# Patient Record
Sex: MALE | Race: WHITE | NOT HISPANIC OR LATINO | URBAN - NONMETROPOLITAN AREA
[De-identification: names, ages, dates, MRNs, and addresses within clinical notes are randomized per-mention and may not be internally consistent; named-entity substitution may affect disease eponyms.]

---

## 2018-02-26 ENCOUNTER — OFFICE VISIT (OUTPATIENT)
Dept: FAMILY MEDICINE | Facility: OTHER | Age: 28
End: 2018-02-26
Attending: FAMILY MEDICINE
Payer: COMMERCIAL

## 2018-02-26 VITALS — WEIGHT: 273.8 LBS | RESPIRATION RATE: 18 BRPM | TEMPERATURE: 98 F

## 2018-02-26 DIAGNOSIS — L25.3 CONTACT DERMATITIS DUE TO OTHER CHEMICAL PRODUCT, UNSPECIFIED CONTACT DERMATITIS TYPE: Primary | ICD-10-CM

## 2018-02-26 PROCEDURE — 99213 OFFICE O/P EST LOW 20 MIN: CPT | Performed by: FAMILY MEDICINE

## 2018-02-26 RX ORDER — PREDNISONE 10 MG/1
TABLET ORAL
Qty: 38 TABLET | Refills: 0 | Status: SHIPPED | OUTPATIENT
Start: 2018-02-26 | End: 2019-09-20

## 2018-02-26 ASSESSMENT — ENCOUNTER SYMPTOMS
FEVER: 0
CHILLS: 0
WHEEZING: 0
COUGH: 0

## 2018-02-26 ASSESSMENT — PAIN SCALES - GENERAL: PAINLEVEL: NO PAIN (0)

## 2018-02-26 NOTE — MR AVS SNAPSHOT
"              After Visit Summary   2018    Perfecto Zhang    MRN: [id number]           Patient Information     Date Of Birth          1990        Visit Information        Provider Department      2018 11:30 AM Burak Santana MD [facility]        Today's Diagnoses     Contact dermatitis due to other chemical product, unspecified contact dermatitis type    -  1       Follow-ups after your visit        Follow-up notes from your care team     Return if symptoms worsen or fail to improve.      Who to contact     If you have questions or need follow up information about today's clinic visit or your schedule please contact [facility] directly at [phone].  Normal or non-critical lab and imaging results will be communicated to you by Lootsiehart, letter or phone within 4 business days after the clinic has received the results. If you do not hear from us within 7 days, please contact the clinic through Lootsiehart or phone. If you have a critical or abnormal lab result, we will notify you by phone as soon as possible.  Submit refill requests through [email or web address] or call your pharmacy and they will forward the refill request to us. Please allow 3 business days for your refill to be completed.          Additional Information About Your Visit        MyChart Information     [email or web address] lets you send messages to your doctor, view your test results, renew your prescriptions, schedule appointments and more. To sign up, go to [email or web address] . Click on \"Log in\" on the left side of the screen, which will take you to the Welcome page. Then click on \"Sign up Now\" on the right side of the page.     You will be asked to enter the access code listed below, as well as some personal information. Please follow the directions to create your username and password.     Your access code is: [email or web address]  Expires: 2018 12:02 PM     Your access code will  in 90 days. If you need help " or a new code, please call your [place] clinic or [phone].        Care EveryWhere ID     This is your Care EveryWhere ID. This could be used by other organizations to access your [facility] [place] medical records  [id number]        Your Vitals Were     Temperature Respirations                98  F (36.7  C) (Temporal) 18           Blood Pressure from Last 3 Encounters:   06/08/16 138/68   01/21/16 148/86    Weight from Last 3 Encounters:   02/26/18 273 lb 12.8 oz (124.2 kg)   06/08/16 212 lb 9.6 oz (96.4 kg)   01/21/16 208 lb 3.2 oz (94.4 kg)              Today, you had the following     No orders found for display         Today's Medication Changes          These changes are accurate as of 2/26/18 12:02 PM.  If you have any questions, ask your nurse or doctor.               Start taking these medicines.        Dose/Directions    predniSONE 10 MG tablet   Commonly known as:  DELTASONE   Used for:  Contact dermatitis due to other chemical product, unspecified contact dermatitis type   Started by:  Burak Santana MD        Two tabs twice daily for four days One tab twice daily for four days One tab helms for four days One half tab daily for two days   Quantity:  38 tablet   Refills:  0            Where to get your medicines      These medications were sent to [facility] [address] [place], [place] - [address]  [address], [facility] [address]     Phone:  [phone]     predniSONE 10 MG tablet                Primary Care Provider Fax #    Physician No Ref-Primary [phone]       No address on file        Equal Access to Services     MICHAEL JONAS : Hadii bg macario Sotito, waaxda luqadaha, qaybta kaalmada christiano bobo. So [facility] [phone].    ATENCIÓN: Si habla español, tiene a brandon disposición servicios gratuitos de asistencia lingüística. Llame al [phone].    We comply with applicable federal civil rights laws and  [place] laws. We do not discriminate on the basis of race, color, national origin, age, disability, sex, sexual orientation, or gender identity.            Thank you!     Thank you for choosing [facility] AND [facility]  for your care. Our goal is always to provide you with excellent care. Hearing back from our patients is one way we can continue to improve our services. Please take a few minutes to complete the written survey that you may receive in the mail after your visit with us. Thank you!             Your Updated Medication List - Protect others around you: Learn how to safely use, store and throw away your medicines at www.disposemymeds.org.          This list is accurate as of 2/26/18 12:02 PM.  Always use your most recent med list.                   Brand Name Dispense Instructions for use Diagnosis    predniSONE 10 MG tablet    DELTASONE    38 tablet    Two tabs twice daily for four days One tab twice daily for four days One tab helms for four days One half tab daily for two days    Contact dermatitis due to other chemical product, unspecified contact dermatitis type

## 2018-02-26 NOTE — PROGRESS NOTES
SUBJECTIVE:   Perfecto Zhang is a 27 year old male who presents to clinic today for the following health issues:    HPI Comments: Rash for about 1 week.  Getting worse.  Comes in raised patches on both arms and face only.  Some of the lesions will weep clear fluid.  It is very itchy.  Exposed to lots of chemicals at work.  At this time he does not want to report it as work comp.  There is a rust preventor and a foamer which has come in contact with his face and arms.  No new foods.  Initially he thought it was poison ivy from wearing an old hunting shirt.  Calomine helps.  Started benadryl last night with some relief as well.        There are no active problems to display for this patient.    Past Surgical History:   Procedure Laterality Date     OTHER SURGICAL HISTORY      [id number],NO PAST SURGERIES     No current outpatient prescriptions on file.     Allergies   Allergen Reactions     Penicillins Unknown     Family allergy.       Review of Systems   Constitutional: Negative for chills and fever.   Respiratory: Negative for cough and wheezing.    Skin: Positive for rash.        OBJECTIVE:     Temp 98  F (36.7  C) (Temporal)  Resp 18  Wt 273 lb 12.8 oz (124.2 kg)  There is no height or weight on file to calculate BMI.  Physical Exam   Constitutional: He is oriented to person, place, and time. He appears well-developed and well-nourished. No distress.   Neurological: He is alert and oriented to person, place, and time.   Skin: He is not diaphoretic.   bilat forearms with red raised patches, generally round, rough on palpation.  Each side with about 20 or so.  Face has similar spots, into forehead, about 10 or so on face.       Diagnostic Test Results:  none     ASSESSMENT/PLAN:       (L25.3) Contact dermatitis due to other chemical product, unspecified contact dermatitis type  (primary encounter diagnosis)  Comment: new  Plan: predniSONE (DELTASONE) 10 MG tablet               Given this is distributed over on his  face and forearms only, it makes me think more of a contact dermatitis.  Can be allergic, or even chemical irritant from work.  Will do a prednisone taper.      Burak Santana MD  [facility] AND [facility]

## 2018-02-26 NOTE — NURSING NOTE
coming in with a rash on the face and arms, last Monday  Julieta Good LPN on 2/26/2018 at 11:38 AM

## 2018-03-01 ENCOUNTER — DOCUMENTATION ONLY (OUTPATIENT)
Dept: FAMILY MEDICINE | Facility: OTHER | Age: 28
End: 2018-03-01

## 2019-09-20 ENCOUNTER — OFFICE VISIT (OUTPATIENT)
Dept: FAMILY MEDICINE | Facility: OTHER | Age: 29
End: 2019-09-20
Attending: FAMILY MEDICINE
Payer: COMMERCIAL

## 2019-09-20 VITALS
BODY MASS INDEX: 34.59 KG/M2 | RESPIRATION RATE: 16 BRPM | OXYGEN SATURATION: 98 % | WEIGHT: 278.2 LBS | SYSTOLIC BLOOD PRESSURE: 139 MMHG | HEART RATE: 94 BPM | TEMPERATURE: 98.6 F | HEIGHT: 75 IN | DIASTOLIC BLOOD PRESSURE: 83 MMHG

## 2019-09-20 DIAGNOSIS — Z00.00 ROUTINE GENERAL MEDICAL EXAMINATION AT A HEALTH CARE FACILITY: Primary | ICD-10-CM

## 2019-09-20 DIAGNOSIS — E66.9 OBESITY (BMI 35.0-39.9 WITHOUT COMORBIDITY): ICD-10-CM

## 2019-09-20 LAB
CHOLEST SERPL-MCNC: 178 MG/DL
GLUCOSE SERPL-MCNC: 81 MG/DL (ref 70–105)
HDLC SERPL-MCNC: 41 MG/DL (ref 23–92)
LDLC SERPL CALC-MCNC: 110 MG/DL
NONHDLC SERPL-MCNC: 137 MG/DL
TRIGL SERPL-MCNC: 134 MG/DL

## 2019-09-20 PROCEDURE — 36415 COLL VENOUS BLD VENIPUNCTURE: CPT | Mod: ZL | Performed by: FAMILY MEDICINE

## 2019-09-20 PROCEDURE — 80061 LIPID PANEL: CPT | Mod: ZL | Performed by: FAMILY MEDICINE

## 2019-09-20 PROCEDURE — 90471 IMMUNIZATION ADMIN: CPT | Performed by: FAMILY MEDICINE

## 2019-09-20 PROCEDURE — 99395 PREV VISIT EST AGE 18-39: CPT | Mod: 25 | Performed by: FAMILY MEDICINE

## 2019-09-20 PROCEDURE — 82947 ASSAY GLUCOSE BLOOD QUANT: CPT | Mod: ZL | Performed by: FAMILY MEDICINE

## 2019-09-20 PROCEDURE — 90715 TDAP VACCINE 7 YRS/> IM: CPT | Performed by: FAMILY MEDICINE

## 2019-09-20 ASSESSMENT — PAIN SCALES - GENERAL: PAINLEVEL: NO PAIN (1)

## 2019-09-20 ASSESSMENT — ANXIETY QUESTIONNAIRES
1. FEELING NERVOUS, ANXIOUS, OR ON EDGE: NOT AT ALL
6. BECOMING EASILY ANNOYED OR IRRITABLE: NOT AT ALL
GAD7 TOTAL SCORE: 0
3. WORRYING TOO MUCH ABOUT DIFFERENT THINGS: NOT AT ALL
5. BEING SO RESTLESS THAT IT IS HARD TO SIT STILL: NOT AT ALL
IF YOU CHECKED OFF ANY PROBLEMS ON THIS QUESTIONNAIRE, HOW DIFFICULT HAVE THESE PROBLEMS MADE IT FOR YOU TO DO YOUR WORK, TAKE CARE OF THINGS AT HOME, OR GET ALONG WITH OTHER PEOPLE: NOT DIFFICULT AT ALL
7. FEELING AFRAID AS IF SOMETHING AWFUL MIGHT HAPPEN: NOT AT ALL
2. NOT BEING ABLE TO STOP OR CONTROL WORRYING: NOT AT ALL

## 2019-09-20 ASSESSMENT — PATIENT HEALTH QUESTIONNAIRE - PHQ9: 5. POOR APPETITE OR OVEREATING: NOT AT ALL

## 2019-09-20 ASSESSMENT — MIFFLIN-ST. JEOR: SCORE: 2314.6

## 2019-09-20 NOTE — LETTER
September 24, 2019      Perfecto Zhang  [address]  [facility] [address]        Dear Perfecto,     This is great.    Results for orders placed or performed in visit on 09/20/19   Glucose   Result Value Ref Range    Glucose 81 70 - 105 mg/dL   Lipid Profile   Result Value Ref Range    Cholesterol 178 <200 mg/dL    Triglycerides 134 <150 mg/dL    HDL Cholesterol 41 23 - 92 mg/dL    LDL Cholesterol Calculated 110 (H) <100 mg/dL    Non HDL Cholesterol 137 (H) <130 mg/dL           Sincerely,        Burak Santana MD

## 2019-09-20 NOTE — NURSING NOTE
"Coming in for a physical check up    Chief Complaint   Patient presents with     Physical     check up       Initial /83 (BP Location: Right arm, Patient Position: Sitting, Cuff Size: Adult Large)   Pulse 94   Temp 98.6  F (37  C) (Tympanic)   Resp 16   Ht 1.892 m (6' 2.5\")   Wt 126.2 kg (278 lb 3.2 oz)   SpO2 98%   BMI 35.24 kg/m   Estimated body mass index is 35.24 kg/m  as calculated from the following:    Height as of this encounter: 1.892 m (6' 2.5\").    Weight as of this encounter: 126.2 kg (278 lb 3.2 oz).  Medication Reconciliation: complete    Julieta Good LPN  "

## 2019-09-20 NOTE — PROGRESS NOTES
SUBJECTIVE:   CC: Perfecto Zhang is an 28 year old male who presents for preventive health visit.     Healthy Habits:    Do you get at least three servings of calcium containing foods daily (dairy, green leafy vegetables, etc.)? no    Amount of exercise or daily activities, outside of work: 1-3 day(s) per week    Problems taking medications regularly No    Medication side effects: No    Have you had an eye exam in the past two years? no    Do you see a dentist twice per year? yes    Do you have sleep apnea, excessive snoring or daytime drowsiness?no      Dad with MI in his 50's.  Brother with diabetes at age 35.  Wants to be tested.  Pt has no symptoms of either.    Today's PHQ-2 Score:   PHQ-2 ( 1999 [employer]) 9/20/2019 2/26/2018   Q1: Little interest or pleasure in doing things 0 0   Q2: Feeling down, depressed or hopeless 0 0   PHQ-2 Score 0 0       Abuse: Current or Past(Physical, Sexual or Emotional)- No  Do you feel safe in your environment? Yes    Social History     Tobacco Use     Smoking status: Never Smoker     Smokeless tobacco: Never Used   Substance Use Topics     Alcohol use: No     Alcohol/week: 0.0 oz     If you drink alcohol do you typically have >3 drinks per day or >7 drinks per week? No                      Last PSA: No results found for: PSA    Reviewed orders with patient. Reviewed health maintenance and updated orders accordingly - Yes  Lab work is in process  No current outpatient medications on file.     Allergies   Allergen Reactions     Penicillins Unknown     Family allergy.       Reviewed and updated as needed this visit by clinical staff  Tobacco  Allergies  Meds  Med Hx  Soc Hx        Reviewed and updated as needed this visit by Provider        History reviewed. No pertinent past medical history.   Past Surgical History:   Procedure Laterality Date     OTHER SURGICAL HISTORY      [id number],NO PAST SURGERIES       ROS:  CONSTITUTIONAL: NEGATIVE for fever, chills, change in  "weight  INTEGUMENTARY/SKIN: NEGATIVE for worrisome rashes, moles or lesions  EYES: NEGATIVE for vision changes or irritation  ENT: NEGATIVE for ear, mouth and throat problems  RESP: NEGATIVE for significant cough or SOB  CV: NEGATIVE for chest pain, palpitations or peripheral edema  GI: NEGATIVE for nausea, abdominal pain, heartburn, or change in bowel habits   male: negative for dysuria, hematuria, decreased urinary stream, erectile dysfunction, urethral discharge  MUSCULOSKELETAL: NEGATIVE for significant arthralgias or myalgia  NEURO: NEGATIVE for weakness, dizziness or paresthesias  PSYCHIATRIC: NEGATIVE for changes in mood or affect    OBJECTIVE:   /83 (BP Location: Right arm, Patient Position: Sitting, Cuff Size: Adult Large)   Pulse 94   Temp 98.6  F (37  C) (Tympanic)   Resp 16   Ht 1.892 m (6' 2.5\")   Wt 126.2 kg (278 lb 3.2 oz)   SpO2 98%   BMI 35.24 kg/m    EXAM:  GENERAL: healthy, alert and no distress  EYES: Eyes grossly normal to inspection, PERRL and conjunctivae and sclerae normal  HENT: ear canals and TM's normal, nose and mouth without ulcers or lesions  NECK: no adenopathy, no asymmetry, masses, or scars and thyroid normal to palpation  RESP: lungs clear to auscultation - no rales, rhonchi or wheezes  CV: regular rate and rhythm, normal S1 S2, no S3 or S4, no murmur, click or rub, no peripheral edema and peripheral pulses strong  ABDOMEN: soft, nontender, no hepatosplenomegaly, no masses and bowel sounds normal  MS: no gross musculoskeletal defects noted, no edema  SKIN: no suspicious lesions or rashes  NEURO: Normal strength and tone, mentation intact and speech normal  PSYCH: mentation appears normal, affect normal/bright    Diagnostic Test Results:  Labs reviewed in Epic    ASSESSMENT/PLAN:       ICD-10-CM    1. Routine general medical examination at a health care facility Z00.00 Lipid Profile     Glucose       COUNSELING:  Reviewed preventive health counseling, as reflected in " "patient instructions       Regular exercise       Healthy diet/nutrition    Estimated body mass index is 35.24 kg/m  as calculated from the following:    Height as of this encounter: 1.892 m (6' 2.5\").    Weight as of this encounter: 126.2 kg (278 lb 3.2 oz).    Weight management plan: Discussed healthy diet and exercise guidelines     reports that he has never smoked. He has never used smokeless tobacco.      Counseling Resources:  ATP IV Guidelines  Pooled Cohorts Equation Calculator  FRAX Risk Assessment  ICSI Preventive Guidelines  Dietary Guidelines for Americans, 2010  USDA's MyPlate  ASA Prophylaxis  Lung CA Screening    Burak Santana MD  [facility]  "

## 2019-09-21 ASSESSMENT — ANXIETY QUESTIONNAIRES: GAD7 TOTAL SCORE: 0

## 2019-10-10 ENCOUNTER — OFFICE VISIT (OUTPATIENT)
Dept: FAMILY MEDICINE | Facility: OTHER | Age: 29
End: 2019-10-10
Attending: FAMILY MEDICINE
Payer: COMMERCIAL

## 2019-10-10 VITALS — HEIGHT: 75 IN | WEIGHT: 277 LBS | BODY MASS INDEX: 34.44 KG/M2

## 2019-10-10 PROCEDURE — 97802 MEDICAL NUTRITION INDIV IN: CPT | Performed by: DIETITIAN, REGISTERED

## 2019-10-10 ASSESSMENT — MIFFLIN-ST. JEOR: SCORE: 2304.15

## 2019-10-10 NOTE — PROGRESS NOTES
"[facility]  Medical Nutrition Therapy    Visit Type: Initial Assessment    Perfecto Zhang referred by Dr. Santana for MNT related to Obesity, Risk for DM    Patient accompanied by his wife, Jo Ann and 13-month old daughter.    Both Perfecto and his wife would like to lose weight to healthy BMIs and prevent DM.    Risk factors for Type 2 DM: brother with Type 2  Both Perfecto's father and grandfather had heart attacks at at 55.    They bring many questions today, along with food labels.  Perfecto is very motivated and in the action stage of change. He has started eating more vegetables, and using his exercise bike at home.         Nutrition Assessment:  Anthropometrics  Height: .  189.2 cm (6' 2.5\") BMI:    35.09  Weight:  125.6 kg (277 lb) BSA:  2.57  IBW (kg):  Female: 79.8 Male: 84.8    Review of Lipids, glucose.          Nutrition History   B-has been having instant b-fast with 16 oz skim milk  L-brings to work, fruit, veggie, sandwich or leftovers  S-at home. His wife mostly cooks. He is trying to eat all food groups and watch his portions.      Physical Activity   on feet at work.  Gets about 50 minutes per week of formal exercise.      Nutrition Prescription  Energy: 2,100-2,300         Protein:      90 grams      Fluid:   80 oz     Fat:   60 grams       Carbohydrate:      20-30 grams per meal      Fiber:35             Micronutrient:      none                    Food Record         does not track but is willing to          Nutrition Diagnosis:     BMI 35  Risk for DM (family history, inactive, BMI)    Nutrition Intervention:   Review of balanced diet watching portions and including all food groups. We will work on a lower CHO, Mediterranean diet plan for about 2000 calories.    Discussion of exercise for weight loss. We talked about sensible choices and low-cost healthy plans and recipes.  His wife is very supportive and they will do well with the changes we talked about.     Materials: cookbook, websites, " meal planning worksheet, recipes, snack list    Nutrition Goals:   (1) lose 7% weight within 3 months  (2) exercise 150-200 minutes per week  (3) reduce portions by 25%    Nutrition Follow Up / Monitoring:   as needed    Nutrition Recommendations:     Add Vit D3 2000 international unit(s) per day    Time spent: 60 min  Patient has RD contact information to call/email if needed.    KRISTIN K. KLINEFELTER, OLIVIA on 10/10/2019 at 5:25 PM                        ROS      Physical Exam